# Patient Record
Sex: FEMALE | Employment: FULL TIME | URBAN - METROPOLITAN AREA
[De-identification: names, ages, dates, MRNs, and addresses within clinical notes are randomized per-mention and may not be internally consistent; named-entity substitution may affect disease eponyms.]

---

## 2012-08-10 LAB
EXTERNAL HIV SCREEN: NORMAL
HCV AB SER-ACNC: NEGATIVE

## 2022-03-14 ENCOUNTER — OFFICE VISIT (OUTPATIENT)
Dept: URGENT CARE | Facility: CLINIC | Age: 47
End: 2022-03-14
Payer: COMMERCIAL

## 2022-03-14 VITALS
HEIGHT: 63 IN | WEIGHT: 140 LBS | TEMPERATURE: 98.5 F | BODY MASS INDEX: 24.8 KG/M2 | HEART RATE: 77 BPM | OXYGEN SATURATION: 100 % | RESPIRATION RATE: 18 BRPM

## 2022-03-14 DIAGNOSIS — R68.89 FLU-LIKE SYMPTOMS: ICD-10-CM

## 2022-03-14 DIAGNOSIS — R05.1 ACUTE COUGH: Primary | ICD-10-CM

## 2022-03-14 PROCEDURE — 87636 SARSCOV2 & INF A&B AMP PRB: CPT | Performed by: EMERGENCY MEDICINE

## 2022-03-14 PROCEDURE — 99203 OFFICE O/P NEW LOW 30 MIN: CPT | Performed by: EMERGENCY MEDICINE

## 2022-03-14 RX ORDER — ALBUTEROL SULFATE 90 UG/1
2 AEROSOL, METERED RESPIRATORY (INHALATION) EVERY 6 HOURS PRN
Qty: 8.5 G | Refills: 0 | Status: SHIPPED | OUTPATIENT
Start: 2022-03-14

## 2022-03-14 RX ORDER — BUPROPION HYDROCHLORIDE 75 MG/1
75 TABLET ORAL DAILY
COMMUNITY
Start: 2022-01-06

## 2022-03-14 NOTE — LETTER
March 14, 2022     Patient: Shimon Ingram   YOB: 1975   Date of Visit: 3/14/2022       To Whom it May Concern:    Shimon Ingram was seen in my clinic on 3/14/2022  She should remain out of work/school for 5 days since symptom onset or 24 hours fever free without the use of fever reducing drugs, whichever is longer AND overall general improvement in symptoms and must adhere to strict masking guidelines for 5 more days         If you have any questions or concerns, please don't hesitate to call           Sincerely,          Nisha Brandon MD        CC: No Recipients

## 2022-03-14 NOTE — PROGRESS NOTES
[facility]        NAME: Julieta Mohr is a 48 y o  female  : 1975    MRN: [id number]  DATE: 2022  TIME: 8:24 AM    Assessment and Plan   Acute cough [R05 1]  1  Acute cough  Covid/Flu-Office Collect         Patient Instructions     Patient Instructions     You have been diagnosed with a flu-like illness, and your symptoms should resolve over the next 7 to 10 days with the treatments recommended today  If they do not, it is possible that you have developed a bacterial infection and you should return  If you were to take an antibiotic while you are still in the viral stage, you will not get better any faster, but could kill off good germs in your body as well as make germs resistant to the antibiotic  Take an expectorant - guaifenesin should be the only ingredient - during the day, and the cough suppressant (ex  Robitussin DM or Tessalon) if needed at night only  Take Zinc 50 mg every 12 hours for the next week  You should also take Quercetin 500 mg twice daily with it  You should also take vitamin D3 5000 i u s per day for the next 1 week, and vitamin-C 1 g daily  May take Flonase as discussed  You may also take a decongestant like Sudafed, unless you have hypertension or cardiac disease  You may take Imodium for diarrhea according to package instructions  Today you were tested for COVID-19 and influenza  Results will return approximately 5 days  The fastest way to receive results is to download the [facility] MyChart brissa on your phone or great account on a computer  If you view your results on [employer], we will not call you  If you do not see results on [employer], we will call you if your positive or negative  WE CANNOT PRINT YOUR TEST RESULTS FROM THE URGENT CARE  This is against a law called HIPAA  You may print results from your [facility] (IT help [phone] option 5) or call medical records at [phone]  Prophylactically self quarantine   Department  health's newest recommendations as of December 27,2021 state the following:     IF you TEST POSITIVE for COVID-19  -stay home for 5 days  If you have no symptoms or your symptoms are resolving after 5 days, you can leave your house  Continue to wear a mask around others for 5 additional days  If you have a fever, continue to stay home until you fever resolves  IF YOU WERE EXPOSED TO SOMEONE WITH COVID 19  -if you have been boosted OR completed the primary series of Pfizer or Moderna vaccine within the last 6 months OR completed the primary series of J&J vaccine within the last 2 months, wear a mask around others for 10 days  Get tested on day 5 if possible  If you develop symptoms, get a test and stay home      -if you completed the primary series of Pfizer or Moderna vaccine over 6 months ago and are NOT boosted OR completed the primary series of J&J over 2 months ago and are NOT boosted OR are unvaccinated, stay home for 5 days  After that continue to wear a mask around others for 5 additional days  If you can not quarantine you must wear a mask for 10 days  Test on day 5 if possible  If you develops symptoms get a test and stay home  Drink lots of fluids to maintain hydration  Do not touch your face, wash hands often, and practice social distancing  There is no treatment for simple outpatient COVID-19 patients however, CDC recommends 2000 units vitamin D3 to boost the immune system  Those with severe illness, older age, or multiple comorbidities may qualify for monoclonal antibody infusions as treatment  Please call your doctor to see if you qualify  Call your family doctor to have a follow-up appointment in next few days  Go to ER if he began experiencing chest pain, shortness of breath, fever that is not responding to antipyretics or other severe symptoms  Follow up with PCP in 3-5 days  Proceed to ER if symptoms worsen      Flu-like illness   AMBULATORY CARE:   Flu-like illness is an infection caused by a virus  The flu is easily spread when an infected person coughs, sneezes, or has close contact with others  You may be able to spread the flu to others for 1 week or longer after signs or symptoms appear  Common signs and symptoms include the following:   · Fever and chills    · Headaches, body aches, and muscle or joint pain    · Cough, runny nose, and sore throat    · Loss of appetite, nausea, vomiting, or diarrhea    · Tiredness    · Trouble breathing  Call 911 for any of the following:   · You have trouble breathing, and your lips look purple or blue  · You have a seizure  Seek care immediately if:   · You are dizzy, or you are urinating less or not at all  · You have a headache with a stiff neck, and you feel tired or confused  · You have new pain or pressure in your chest     · Your symptoms, such as shortness of breath, vomiting, or diarrhea, get worse  · Your symptoms, such as fever and coughing, seem to get better, but then get worse  Contact your healthcare provider if:   · You have new muscle pain or weakness  · You have questions or concerns about your condition or care  Treatment for influenza  may include any of the following:  · Acetaminophen decreases pain and fever  It is available without a doctor's order  Ask how much to take and how often to take it  Follow directions  Acetaminophen can cause liver damage if not taken correctly  · NSAIDs  such as ibuprofen, help decrease swelling, pain, and fever  This medicine is available with or without a doctor's order  NSAIDs can cause stomach bleeding or kidney problems in certain people  If you take blood thinner medicine, always ask your healthcare provider if NSAIDs are safe for you  Always read the medicine label and follow directions  · Antivirals  help fight a viral infection  Manage your symptoms:   · Rest  as much as you can to help you recover  · Drink liquids as directed  to help prevent dehydration   Ask how much liquid to drink each day and which liquids are best for you  Prevent the spread of the flu:   · Wash your hands often  Use soap and water  Wash your hands after you use the bathroom, change a child's diapers, or sneeze  Wash your hands before you prepare or eat food  Use gel hand cleanser when soap and water are not available  Do not touch your eyes, nose, or mouth unless you have washed your hands first        · Cover your mouth when you sneeze or cough  Cough into a tissue or the bend of your arm  · Clean shared items with a germ-killing   Clean table surfaces, doorknobs, and light switches  Do not share towels, silverware, and dishes with people who are sick  Wash bed sheets, towels, silverware, and dishes with soap and water  · Wear a mask  over your mouth and nose if you are sick or are near anyone who is sick  · Stay away from others  if you are sick  · Influenza vaccine  helps prevent influenza (flu)  Everyone older than 6 months should get a yearly influenza vaccine  Get the vaccine as soon as it is available, usually in September or October each year  Follow up with your healthcare provider as directed:  Write down your questions so you remember to ask them during your visits  © 2017 [employer] Information is for End User's use only and may not be sold, redistributed or otherwise used for commercial purposes  All illustrations and images included in CareNotes® are the copyrighted property of [employer]  or [employer]  The above information is an  only  It is not intended as medical advice for individual conditions or treatments  Talk to your doctor, nurse or pharmacist before following any medical regimen to see if it is safe and effective for you       COVID-19    [address]     Your healthcare provider and/or public health staff have evaluated you and have determined that you do not need to be hospitalized at this time  At this time you can be isolated at home where you will be monitored by staff from your local or state health department  You should carefully follow the prevention and isolation steps below until a healthcare provider or local or state health department says that you can return to your normal activities  Stay home except to get medical care     People who are mildly ill with COVID-19 are able to isolate at home during their illness  You should restrict activities outside your home, except for getting medical care  Do not go to work, school, or public areas  Avoid using public transportation, ride-sharing, or taxis  Separate yourself from other people and animals in your home     People: As much as possible, you should stay in a specific room and away from other people in your home  Also, you should use a separate bathroom, if available  Animals: You should restrict contact with pets and other animals while you are sick with COVID-19, just like you would around other people  Although there have not been reports of pets or other animals becoming sick with COVID-19, it is still recommended that people sick with COVID-19 limit contact with animals until more information is known about the virus  When possible, have another member of your household care for your animals while you are sick  If you are sick with COVID-19, avoid contact with your pet, including petting, snuggling, being kissed or licked, and sharing food  If you must care for your pet or be around animals while you are sick, wash your hands before and after you interact with pets and wear a facemask  See COVID-19 and Animals for more information  Call ahead before visiting your doctor     If you have a medical appointment, call the healthcare provider and tell them that you have or may have COVID-19   This will help the healthcare providers office take steps to keep other people from getting infected or exposed  Wear a facemask     You should wear a facemask when you are around other people (e g , sharing a room or vehicle) or pets and before you enter a healthcare providers office  If you are not able to wear a facemask (for example, because it causes trouble breathing), then people who live with you should not stay in the same room with you, or they should wear a facemask if they enter your room  Cover your coughs and sneezes     Cover your mouth and nose with a tissue when you cough or sneeze  Throw used tissues in a lined trash can  Immediately wash your hands with soap and water for at least 20 seconds or, if soap and water are not available, clean your hands with an alcohol-based hand  that contains at least 60% alcohol  Clean your hands often     Wash your hands often with soap and water for at least 20 seconds, especially after blowing your nose, coughing, or sneezing; going to the bathroom; and before eating or preparing food  If soap and water are not readily available, use an alcohol-based hand  with at least 60% alcohol, covering all surfaces of your hands and rubbing them together until they feel dry  Soap and water are the best option if hands are visibly dirty  Avoid touching your eyes, nose, and mouth with unwashed hands  Avoid sharing personal household items     You should not share dishes, drinking glasses, cups, eating utensils, towels, or bedding with other people or pets in your home  After using these items, they should be washed thoroughly with soap and water  Clean all high-touch surfaces everyday     High touch surfaces include counters, tabletops, doorknobs, bathroom fixtures, toilets, phones, keyboards, tablets, and bedside tables  Also, clean any surfaces that may have blood, stool, or body fluids on them  Use a household cleaning spray or wipe, according to the label instructions   Labels contain instructions for safe and effective use of the cleaning product including precautions you should take when applying the product, such as wearing gloves and making sure you have good ventilation during use of the product  Monitor your symptoms     Seek prompt medical attention if your illness is worsening (e g , difficulty breathing)  Before seeking care, call your healthcare provider and tell them that you have, or are being evaluated for, COVID-19  Put on a facemask before you enter the facility  These steps will help the healthcare providers office to keep other people in the office or waiting room from getting infected or exposed  Ask your healthcare provider to call the local or [facility] health department  Persons who are placed under active monitoring or facilitated self-monitoring should follow instructions provided by their local health department or occupational health professionals, as appropriate  If you have a medical emergency and need to call 911, notify the dispatch personnel that you have, or are being evaluated for COVID-19  If possible, put on a facemask before emergency medical services arrive  Discontinuing home isolation     Patients with confirmed COVID-19 should remain under home isolation precautions until the risk of secondary transmission to others is thought to be low  The decision to discontinue home isolation precautions should be made on a case-by-case basis, in consultation with healthcare providers and [facility] and [facility] health departments  Source: [employer]        Proceed to ER if symptoms worsen        Follow up with PCP in 3-5 days  Proceed to  ER if symptoms worsen      Chief Complaint     Chief Complaint   Patient presents with    COVID-19     c/o cough, fatigue,generalized aches, headache, fever, onset 4 days ago         History of Present Illness       Patient complains of cough, congestion, generalized aches, fatigue, fevers, headaches for the past 4 days       Review of Systems   Review of Systems   Constitutional: Positive for fever  Negative for appetite change, chills and fatigue  HENT: Positive for congestion, rhinorrhea, sinus pressure and sore throat  Negative for trouble swallowing and voice change  Respiratory: Positive for cough and chest tightness  Negative for shortness of breath and wheezing  Cardiovascular: Negative for chest pain  Gastrointestinal: Negative for nausea  Musculoskeletal: Positive for myalgias  Neurological: Positive for headaches  Current Medications       Current Outpatient Medications:     buPROPion (WELLBUTRIN) 75 mg tablet, Take 75 mg by mouth in the morning, Disp: , Rfl:     sertraline (ZOLOFT) 50 mg tablet, Take 50 mg by mouth in the morning, Disp: , Rfl:     Current Allergies     Allergies as of 03/14/2022    (No Known Allergies)            The following portions of the patient's history were reviewed and updated as appropriate: allergies, current medications, past family history, past medical history, past social history, past surgical history and problem list      Past Medical History:   Diagnosis Date    Anxiety        Past Surgical History:   Procedure Laterality Date    CHOLECYSTECTOMY      MOUTH SURGERY         Family History   Problem Relation Age of Onset    Heart disease Mother     Hypertension Mother     Heart disease Father     Hypertension Father          Medications have been verified  Objective   Ht 5' 3" (1 6 m)   Wt 63 5 kg (140 lb)   BMI 24 80 kg/m²        Physical Exam     Physical Exam  Vitals and nursing note reviewed  Constitutional:       General: She is not in acute distress  Appearance: She is well-developed  HENT:      Head: Normocephalic and atraumatic  Nose: Mucosal edema and congestion present  Mouth/Throat:      Pharynx: Posterior oropharyngeal erythema present  No oropharyngeal exudate  Tonsils: No tonsillar abscesses  Cardiovascular:      Rate and Rhythm: Normal rate and regular rhythm  Comments: Mild tachycardia  Pulmonary:      Effort: Pulmonary effort is normal  No respiratory distress  Breath sounds: No wheezing or rales  Abdominal:      General: Bowel sounds are normal       Palpations: Abdomen is soft  Musculoskeletal:      Cervical back: Neck supple  Skin:     General: Skin is warm and dry  Findings: No rash  Neurological:      Mental Status: She is alert and oriented to person, place, and time  Psychiatric:         Mood and Affect: Mood normal          Behavior: Behavior normal          Thought Content:  Thought content normal          Judgment: Judgment normal

## 2022-03-14 NOTE — PATIENT INSTRUCTIONS
You have been diagnosed with a flu-like illness, and your symptoms should resolve over the next 7 to 10 days with the treatments recommended today  If they do not, it is possible that you have developed a bacterial infection and you should return  If you were to take an antibiotic while you are still in the viral stage, you will not get better any faster, but could kill off good germs in your body as well as make germs resistant to the antibiotic  Take an expectorant - guaifenesin should be the only ingredient - during the day, and the cough suppressant (ex  Robitussin DM or Tessalon) if needed at night only  Take Zinc 50 mg every 12 hours for the next week  You should also take Quercetin 500 mg twice daily with it  You should also take vitamin D3 5000 i u s per day for the next 1 week, and vitamin-C 1 g daily  May take Flonase as discussed  You may also take a decongestant like Sudafed, unless you have hypertension or cardiac disease  You may take Imodium for diarrhea according to package instructions  Today you were tested for COVID-19 and influenza  Results will return approximately 5 days  The fastest way to receive results is to download the [facility] MyChart brissa on your phone or great account on a computer  If you view your results on [employer], we will not call you  If you do not see results on [employer], we will call you if your positive or negative  WE CANNOT PRINT YOUR TEST RESULTS FROM THE URGENT CARE  This is against a law called HIPAA  You may print results from your [employer] (IT help [phone] option 5) or call medical records at [phone]  Prophylactically self quarantine  Department of health's newest recommendations as of December 27,2021 state the following:     IF you TEST POSITIVE for COVID-19  -stay home for 5 days  If you have no symptoms or your symptoms are resolving after 5 days, you can leave your house   Continue to wear a mask around others for 5 additional days  If you have a fever, continue to stay home until you fever resolves  IF YOU WERE EXPOSED TO SOMEONE WITH COVID 19  -if you have been boosted OR completed the primary series of Pfizer or Moderna vaccine within the last 6 months OR completed the primary series of J&J vaccine within the last 2 months, wear a mask around others for 10 days  Get tested on day 5 if possible  If you develop symptoms, get a test and stay home      -if you completed the primary series of Pfizer or Moderna vaccine over 6 months ago and are NOT boosted OR completed the primary series of J&J over 2 months ago and are NOT boosted OR are unvaccinated, stay home for 5 days  After that continue to wear a mask around others for 5 additional days  If you can not quarantine you must wear a mask for 10 days  Test on day 5 if possible  If you develops symptoms get a test and stay home  Drink lots of fluids to maintain hydration  Do not touch your face, wash hands often, and practice social distancing  There is no treatment for simple outpatient COVID-19 patients however, CDC recommends 2000 units vitamin D3 to boost the immune system  Those with severe illness, older age, or multiple comorbidities may qualify for monoclonal antibody infusions as treatment  Please call your doctor to see if you qualify  Call your family doctor to have a follow-up appointment in next few days  Go to ER if he began experiencing chest pain, shortness of breath, fever that is not responding to antipyretics or other severe symptoms  Follow up with PCP in 3-5 days  Proceed to ER if symptoms worsen  Flu-like illness   AMBULATORY CARE:   Flu-like illness is an infection caused by a virus  The flu is easily spread when an infected person coughs, sneezes, or has close contact with others  You may be able to spread the flu to others for 1 week or longer after signs or symptoms appear     Common signs and symptoms include the following:   · Fever and chills    · Headaches, body aches, and muscle or joint pain    · Cough, runny nose, and sore throat    · Loss of appetite, nausea, vomiting, or diarrhea    · Tiredness    · Trouble breathing  Call 911 for any of the following:   · You have trouble breathing, and your lips look purple or blue  · You have a seizure  Seek care immediately if:   · You are dizzy, or you are urinating less or not at all  · You have a headache with a stiff neck, and you feel tired or confused  · You have new pain or pressure in your chest     · Your symptoms, such as shortness of breath, vomiting, or diarrhea, get worse  · Your symptoms, such as fever and coughing, seem to get better, but then get worse  Contact your healthcare provider if:   · You have new muscle pain or weakness  · You have questions or concerns about your condition or care  Treatment for influenza  may include any of the following:  · Acetaminophen decreases pain and fever  It is available without a doctor's order  Ask how much to take and how often to take it  Follow directions  Acetaminophen can cause liver damage if not taken correctly  · NSAIDs  such as ibuprofen, help decrease swelling, pain, and fever  This medicine is available with or without a doctor's order  NSAIDs can cause stomach bleeding or kidney problems in certain people  If you take blood thinner medicine, always ask your healthcare provider if NSAIDs are safe for you  Always read the medicine label and follow directions  · Antivirals  help fight a viral infection  Manage your symptoms:   · Rest  as much as you can to help you recover  · Drink liquids as directed  to help prevent dehydration  Ask how much liquid to drink each day and which liquids are best for you  Prevent the spread of the flu:   · Wash your hands often  Use soap and water  Wash your hands after you use the bathroom, change a child's diapers, or sneeze  Wash your hands before you prepare or eat food  Use gel hand cleanser when soap and water are not available  Do not touch your eyes, nose, or mouth unless you have washed your hands first        · Cover your mouth when you sneeze or cough  Cough into a tissue or the bend of your arm  · Clean shared items with a germ-killing   Clean table surfaces, doorknobs, and light switches  Do not share towels, silverware, and dishes with people who are sick  Wash bed sheets, towels, silverware, and dishes with soap and water  · Wear a mask  over your mouth and nose if you are sick or are near anyone who is sick  · Stay away from others  if you are sick  · Influenza vaccine  helps prevent influenza (flu)  Everyone older than 6 months should get a yearly influenza vaccine  Get the vaccine as soon as it is available, usually in September or October each year  Follow up with your healthcare provider as directed:  Write down your questions so you remember to ask them during your visits  © 2017 [address]  Information is for End User's use only and may not be sold, redistributed or otherwise used for commercial purposes  All illustrations and images included in CareNotes® are the copyrighted property of A D A M , Inc  or [employer]  The above information is an  only  It is not intended as medical advice for individual conditions or treatments  Talk to your doctor, nurse or pharmacist before following any medical regimen to see if it is safe and effective for you  [address]     Your healthcare provider and/or public health staff have evaluated you and have determined that you do not need to be hospitalized at this time  At this time you can be isolated at home where you will be monitored by staff from your local or state health department   You should carefully follow the prevention and isolation steps below until a healthcare provider or local or state health department says that you can return to your normal activities  Stay home except to get medical care     People who are mildly ill with COVID-19 are able to isolate at home during their illness  You should restrict activities outside your home, except for getting medical care  Do not go to work, school, or public areas  Avoid using public transportation, ride-sharing, or taxis  Separate yourself from other people and animals in your home     People: As much as possible, you should stay in a specific room and away from other people in your home  Also, you should use a separate bathroom, if available  Animals: You should restrict contact with pets and other animals while you are sick with COVID-19, just like you would around other people  Although there have not been reports of pets or other animals becoming sick with COVID-19, it is still recommended that people sick with COVID-19 limit contact with animals until more information is known about the virus  When possible, have another member of your household care for your animals while you are sick  If you are sick with COVID-19, avoid contact with your pet, including petting, snuggling, being kissed or licked, and sharing food  If you must care for your pet or be around animals while you are sick, wash your hands before and after you interact with pets and wear a facemask  See COVID-19 and Animals for more information  Call ahead before visiting your doctor     If you have a medical appointment, call the healthcare provider and tell them that you have or may have COVID-19  This will help the healthcare providers office take steps to keep other people from getting infected or exposed  Wear a facemask     You should wear a facemask when you are around other people (e g , sharing a room or vehicle) or pets and before you enter a healthcare providers office   If you are not able to wear a facemask (for example, because it causes trouble breathing), then people who live with you should not stay in the same room with you, or they should wear a facemask if they enter your room  Cover your coughs and sneezes     Cover your mouth and nose with a tissue when you cough or sneeze  Throw used tissues in a lined trash can  Immediately wash your hands with soap and water for at least 20 seconds or, if soap and water are not available, clean your hands with an alcohol-based hand  that contains at least 60% alcohol  Clean your hands often     Wash your hands often with soap and water for at least 20 seconds, especially after blowing your nose, coughing, or sneezing; going to the bathroom; and before eating or preparing food  If soap and water are not readily available, use an alcohol-based hand  with at least 60% alcohol, covering all surfaces of your hands and rubbing them together until they feel dry  Soap and water are the best option if hands are visibly dirty  Avoid touching your eyes, nose, and mouth with unwashed hands  Avoid sharing personal household items     You should not share dishes, drinking glasses, cups, eating utensils, towels, or bedding with other people or pets in your home  After using these items, they should be washed thoroughly with soap and water  Clean all high-touch surfaces everyday     High touch surfaces include counters, tabletops, doorknobs, bathroom fixtures, toilets, phones, keyboards, tablets, and bedside tables  Also, clean any surfaces that may have blood, stool, or body fluids on them  Use a household cleaning spray or wipe, according to the label instructions  Labels contain instructions for safe and effective use of the cleaning product including precautions you should take when applying the product, such as wearing gloves and making sure you have good ventilation during use of the product  Monitor your symptoms     Seek prompt medical attention if your illness is worsening (e g , difficulty breathing)  Before seeking care, call your healthcare provider and tell them that you have, or are being evaluated for, COVID-19  Put on a facemask before you enter the facility  These steps will help the healthcare providers office to keep other people in the office or waiting room from getting infected or exposed  Ask your healthcare provider to call the local or state health department  Persons who are placed under active monitoring or facilitated self-monitoring should follow instructions provided by their local health department or occupational health professionals, as appropriate  If you have a medical emergency and need to call 911, notify the dispatch personnel that you have, or are being evaluated for COVID-19  If possible, put on a facemask before emergency medical services arrive  Discontinuing home isolation     Patients with confirmed COVID-19 should remain under home isolation precautions until the risk of secondary transmission to others is thought to be low  The decision to discontinue home isolation precautions should be made on a case-by-case basis, in consultation with healthcare providers and state and local health departments       Source: [employer]        Proceed to ER if symptoms worsen

## 2022-03-15 LAB
FLUAV RNA RESP QL NAA+PROBE: POSITIVE
FLUBV RNA RESP QL NAA+PROBE: NEGATIVE
SARS-COV-2 RNA RESP QL NAA+PROBE: NEGATIVE

## 2022-10-03 NOTE — PROGRESS NOTES
Assessment/Plan:    No problem-specific Assessment & Plan notes found for this encounter  Diagnoses and all orders for this visit:    Anxiety  -     hydrOXYzine HCL (ATARAX) 25 mg tablet; Take 1 tablet (25 mg total) by mouth every 6 (six) hours as needed for itching  -     buPROPion (Wellbutrin XL) 150 mg 24 hr tablet; Take 1 tablet (150 mg total) by mouth every morning  -     Ambulatory referral to [facility]; Future    Overweight (BMI 25 0-29 9)  -     CBC and differential; Future  -     Comprehensive metabolic panel; Future  -     Lipid panel; Future  -     TSH, 3rd generation with Free T4 reflex; Future    Low vitamin D level  -     Vitamin D 25 hydroxy; Future    Pituitary adenoma (HCC)  -     Prolactin; Future    Other orders  -     Cholecalciferol 50 MCG (2000 UT) CAPS; Take 2,000 Units by mouth in the morning  -     Sodium Fluoride 5000 Plus 1 1 % CREA        Discussed anxiety symptoms with patient today  Will refer to behavioral health for therapy  I discussed with patient and she does not want to start any medications that will cause weight gain  At this time I will start her on hydroxyzine p r n  25 mg  We discussed and will continue Wellbutrin I will start her on 150 mg of XL  Patient will establish care at our office at next follow-up visit  Baseline labs were ordered and included vitamin-D  She reports a history of prolactinoma  I will start by checking a prolactin level  Chart will need to be reviewed for her history and see if she has had any imaging done relatively recently  Patient denies any symptoms today  Labs sheet was given to be done at [facility] fasting  Follow-up in 1 month  Subjective:      Patient ID: Tigist Chavira is a 50 y o  female      HPI    The following portions of the patient's history were reviewed and updated as appropriate: allergies, current medications, past family history, past medical history, past social history, past surgical history and problem list     Anxiety  Patient is here for evaluation of anxiety  She has the following anxiety symptoms: difficulty concentrating, fatigue, insomnia, palpitations, racing thoughts, shortness of breath, sweating  Onset of symptoms was approximately several years ago  Symptoms have been gradually worsening since that time  She denies current suicidal and homicidal ideation  Family history significant for anxiety and depression  Possible organic causes contributing are: none  Risk factors: positive family history in  brother(s) Previous treatment includes individual therapy and medication Wellbutrin and Zoloft  She complains of the following medication side effects: weight gain  She states that she stopped both of her medications due to weight gain  She states that she has gone from 105 lb to 150  She does not want to be on medication that will cause her to gain weight  Review of Systems   Constitutional: Positive for diaphoresis  Weight gain   Respiratory: Negative for shortness of breath  Cardiovascular: Positive for palpitations  Negative for chest pain  Gastrointestinal: Negative for abdominal pain, constipation, diarrhea, nausea and vomiting  Psychiatric/Behavioral: Positive for decreased concentration, dysphoric mood and sleep disturbance  Negative for self-injury and suicidal ideas  The patient is nervous/anxious  Some possible OCD symptoms         Objective:      /60 (BP Location: Right arm, Patient Position: Sitting, Cuff Size: Large)   Pulse 77   Ht 5' 3" (1 6 m)   Wt 68 5 kg (151 lb)   SpO2 98%   BMI 26 75 kg/m²          Physical Exam  Vitals and nursing note reviewed  Constitutional:       General: She is not in acute distress  Appearance: Normal appearance  HENT:      Head: Normocephalic and atraumatic        Right Ear: External ear normal       Left Ear: External ear normal       Nose: Nose normal       Mouth/Throat:      Mouth: Mucous membranes are moist       Pharynx: Oropharynx is clear  Eyes:      Extraocular Movements: Extraocular movements intact  Conjunctiva/sclera: Conjunctivae normal       Pupils: Pupils are equal, round, and reactive to light  Cardiovascular:      Rate and Rhythm: Normal rate and regular rhythm  Heart sounds: Normal heart sounds  No murmur heard  No friction rub  No gallop  Pulmonary:      Effort: Pulmonary effort is normal  No respiratory distress  Breath sounds: Normal breath sounds  No wheezing  Musculoskeletal:         General: No tenderness  Normal range of motion  Cervical back: Normal range of motion and neck supple  Lymphadenopathy:      Cervical: No cervical adenopathy  Skin:     General: Skin is warm and dry  Findings: No erythema or rash  Neurological:      General: No focal deficit present  Mental Status: She is alert and oriented to person, place, and time  Mental status is at baseline  Psychiatric:         Behavior: Behavior normal          Thought Content:  Thought content normal          Judgment: Judgment normal       Comments: Anxious

## 2022-10-04 PROBLEM — R79.89 LOW VITAMIN D LEVEL: Status: ACTIVE | Noted: 2021-04-20

## 2022-10-04 PROBLEM — F41.1 GENERALIZED ANXIETY DISORDER: Status: ACTIVE | Noted: 2021-04-20

## 2022-10-04 PROBLEM — R92.8 ABNORMAL MAMMOGRAM OF LEFT BREAST: Status: ACTIVE | Noted: 2019-10-27

## 2022-10-06 ENCOUNTER — TELEPHONE (OUTPATIENT)
Dept: ADMINISTRATIVE | Facility: OTHER | Age: 47
End: 2022-10-06

## 2022-10-06 ENCOUNTER — RA CDI HCC (OUTPATIENT)
Dept: OTHER | Facility: HOSPITAL | Age: 47
End: 2022-10-06

## 2022-10-06 NOTE — TELEPHONE ENCOUNTER
----- Message from Taryn Smart sent at 10/6/2022  9:41 AM EDT -----  Regarding: Care Gap request  10/06/22 9:41 AM    Hello, our patient attached above has had HIV completed/performed  Please assist in updating the patient chart by pulling the Care Everywhere (CE) document  The date of service is 2012    Hep c 2012       Thank you,  Samuel Prado  [address]

## 2022-10-06 NOTE — PROGRESS NOTES
[facility]  coding opportunities       Chart reviewed, no opportunity found: CHART REVIEWED, NO OPPORTUNITY FOUND        Patients Insurance        Commercial Insurance: [address] [place] [address]

## 2022-10-07 NOTE — TELEPHONE ENCOUNTER
Upon review of the In Basket request we were able to locate, review, and update the patient chart as requested for Hepatitis C  and HIV  Any additional questions or concerns should be emailed to the Practice Liaisons via the appropriate education email address, please do not reply via In Basket      Thank you  Dolores Butler

## 2022-10-10 ENCOUNTER — TELEPHONE (OUTPATIENT)
Dept: ADMINISTRATIVE | Facility: OTHER | Age: 47
End: 2022-10-10

## 2022-10-10 ENCOUNTER — OFFICE VISIT (OUTPATIENT)
Dept: FAMILY MEDICINE CLINIC | Facility: CLINIC | Age: 47
End: 2022-10-10

## 2022-10-10 VITALS
HEIGHT: 63 IN | DIASTOLIC BLOOD PRESSURE: 60 MMHG | SYSTOLIC BLOOD PRESSURE: 110 MMHG | OXYGEN SATURATION: 98 % | HEART RATE: 77 BPM | BODY MASS INDEX: 26.75 KG/M2 | WEIGHT: 151 LBS

## 2022-10-10 DIAGNOSIS — D35.2 PITUITARY ADENOMA (HCC): ICD-10-CM

## 2022-10-10 DIAGNOSIS — F41.9 ANXIETY: Primary | ICD-10-CM

## 2022-10-10 DIAGNOSIS — E66.3 OVERWEIGHT (BMI 25.0-29.9): ICD-10-CM

## 2022-10-10 DIAGNOSIS — R79.89 LOW VITAMIN D LEVEL: ICD-10-CM

## 2022-10-10 PROCEDURE — 99203 OFFICE O/P NEW LOW 30 MIN: CPT | Performed by: STUDENT IN AN ORGANIZED HEALTH CARE EDUCATION/TRAINING PROGRAM

## 2022-10-10 RX ORDER — BUPROPION HYDROCHLORIDE 150 MG/1
150 TABLET ORAL EVERY MORNING
Qty: 90 TABLET | Refills: 3 | Status: SHIPPED | OUTPATIENT
Start: 2022-10-10

## 2022-10-10 RX ORDER — SODIUM FLUORIDE 5 MG/G
CREAM DENTAL
COMMUNITY
Start: 2022-10-08

## 2022-10-10 RX ORDER — HYDROXYZINE HYDROCHLORIDE 25 MG/1
25 TABLET, FILM COATED ORAL EVERY 6 HOURS PRN
Qty: 90 TABLET | Refills: 3 | Status: SHIPPED | OUTPATIENT
Start: 2022-10-10

## 2022-10-10 NOTE — LETTER
Please send scanned lab     Procedure Request Form: Cervical Cancer Screening      Date Requested: 10/11/22  Patient: Justine Merlos  Patient : 1975   Referring Provider: No primary care provider on file  Date of Procedure ______________________________       The above patient has informed us that they have completed their   most recent Cervical Cancer Screening at your facility  Please complete   this form and attach all corresponding procedure reports/results  Comments __________________________________________________________  ____________________________________________________________________  ____________________________________________________________________  ____________________________________________________________________    Facility Completing Procedure _________________________________________    Form Completed By (print name) _______________________________________      Signature __________________________________________________________      These reports are needed for  compliance  Please fax this completed form and a copy of the procedure report to our office located at [place] [address] as soon as possible to [phone] ann Gilbert: Phone [phone]    We thank you for your assistance in treating our mutual patient

## 2022-10-10 NOTE — TELEPHONE ENCOUNTER
----- Message from Kimberli Torre, [facility] [place] sent at 10/10/2022 11:28 AM EDT -----  Regarding: Care Gap request  10/10/22 11:28 AM    Hello, our patient attached above has had Pap Smear (HPV) aka Cervical Cancer Screening completed/performed  Please assist in updating the patient chart by pulling the Care Everywhere (CE) document  The date of service is 2020       Thank you,  Kimberli Torre  [address]

## 2022-10-10 NOTE — LETTER
Procedure Request Form: Cervical Cancer Screening      Date Requested: 10/14/22  Patient: Brett Gross  Patient : 1975   Referring Provider: No primary care provider on file  Date of Procedure ______________________________       The above patient has informed us that they have completed their   most recent Cervical Cancer Screening at your facility  Please complete   this form and attach all corresponding procedure reports/results  Comments __________________________________________________________  ____________________________________________________________________  ____________________________________________________________________  ____________________________________________________________________    Facility Completing Procedure _________________________________________    Form Completed By (print name) _______________________________________      Signature __________________________________________________________      These reports are needed for  compliance  Please fax this completed form and a copy of the procedure report to our office located at [place] [address] as soon as possible to [phone] ann Love: Phone [phone]    We thank you for your assistance in treating our mutual patient

## 2022-10-11 NOTE — TELEPHONE ENCOUNTER
Upon review of the In Basket request and the patient's chart, initial outreach has been made via fax, please see Contacts section for details       Thank you  Ashlee Bass

## 2022-10-14 NOTE — TELEPHONE ENCOUNTER
As a follow-up, a second attempt has been made for outreach via fax, please see Contacts section for details       [address] [facility]  [place]   [phone]    Thank you  Russ Mccarty

## 2022-10-17 NOTE — TELEPHONE ENCOUNTER
As a final attempt, a third outreach has been made via telephone call  Please see Contacts section for details  This encounter will be closed and completed by end of day  Should we receive the requested information because of previous outreach attempts, the requested patient's chart will be updated appropriately       Thank you  Carl Lemon

## 2022-10-17 NOTE — TELEPHONE ENCOUNTER
Upon review of the In Basket request we were able to locate, review, and update the patient chart as requested for Pap Smear (HPV) aka Cervical Cancer Screening  Any additional questions or concerns should be emailed to the Practice Liaisons via the appropriate education email address, please do not reply via In Basket      Thank you  Samantha Sanderson

## 2023-02-15 ENCOUNTER — TELEPHONE (OUTPATIENT)
Dept: PSYCHIATRY | Facility: CLINIC | Age: 48
End: 2023-02-15

## 2023-02-15 NOTE — TELEPHONE ENCOUNTER
Dineshr contacted pt in regards to a vm we received and explained to her that we received a routine referral for [facility]  Writer also explained to her that there are no openings available at this time and offered her to place her on the wait list  She refused

## 2023-02-15 NOTE — TELEPHONE ENCOUNTER
Called patient regarding referral to be placed on proper wait list  LVM for patient to call intake dept ([phone]) back

## 2023-05-23 DIAGNOSIS — R45.89 DEPRESSED MOOD: ICD-10-CM

## 2023-05-23 DIAGNOSIS — F41.1 GENERALIZED ANXIETY DISORDER: Primary | ICD-10-CM

## 2023-05-23 RX ORDER — BUPROPION HYDROCHLORIDE 300 MG/1
300 TABLET ORAL EVERY MORNING
Qty: 90 TABLET | Refills: 3 | Status: SHIPPED | OUTPATIENT
Start: 2023-05-23 | End: 2024-05-17

## 2023-08-04 ENCOUNTER — VBI (OUTPATIENT)
Dept: ADMINISTRATIVE | Facility: OTHER | Age: 48
End: 2023-08-04

## 2023-08-04 NOTE — LETTER
Procedure Request Form: Colonoscopy      Date Requested: 23  Patient: Noam Aguiar  Patient : 1975   Referring Provider: Louise Rojas, DO        Date of Procedure ______________________________       The above patient has informed us that they have completed their   most recent Colonoscopy at your facility. Please complete   this form and attach all corresponding procedure reports/results. Comments __ Consult letter received for patients 2023 Colonoscopy. Would you release the OP Report to us?____________________________________________  ____________________________________________________________________  ____________________________________________________________________  ____________________________________________________________________    Facility Completing Procedure _________________________________________    Form Completed By (print name) _______________________________________      Signature __________________________________________________________      These reports are needed for  compliance. Please fax this completed form and a copy of the procedure report to our office located at [address] [place] [address] as soon as possible to Fax [phone] attention Deisi Monte: Phone [phone]    We thank you for your assistance in treating our mutual patient.

## 2023-08-07 NOTE — PROGRESS NOTES
Assessment/Plan:    No problem-specific Assessment & Plan notes found for this encounter. Diagnoses and all orders for this visit:    Eczema, unspecified type  -     Discontinue: triamcinolone (KENALOG) 0.5 % cream; Apply topically 3 (three) times a day  -     triamcinolone (KENALOG) 0.5 % cream; Apply topically 3 (three) times a day    Rash of face  -     Discontinue: desonide (DESOWEN) 0.05 % cream; Apply topically 2 (two) times a day  -     desonide (DESOWEN) 0.05 % cream; Apply topically 2 (two) times a day          Patient is a 42-year-old female presenting for follow-up visit today to discuss eczema. She reports a history of using steroid cream to resolve her symptoms. She would like a refill on triamcinolone cream at this time. I will send in triamcinolone to her pharmacy to be used on areas below the neck. I will also send her in desonide cream that she can use on the face or on the scalp. Follow-up in our office if symptoms or not improving after treatment. 20 minutes spent on physical exam and plan of care. This note was dictated using software. Subjective:      Patient ID: Figueroa Khan is a 50 y.o. female. HPI    The following portions of the patient's history were reviewed and updated as appropriate: allergies, current medications, past family history, past medical history, past social history, past surgical history and problem list.    Patient complains of a rash. Onset of symptoms was several days ago, and have been gradually worsening since that time. Treatment modalities that have been used in the past include: triamcinolone. She states that her rash typically starts on her hands. Sometimes spreads to her elbows or neck/face. It sometimes also goes to her scalp. She needs a refill on triamcinolone cream today. Review of Systems   Respiratory: Negative for shortness of breath. Cardiovascular: Negative for chest pain.    Gastrointestinal: Negative for abdominal pain, constipation and diarrhea. Skin: Positive for rash. Objective:      /80 (BP Location: Left arm, Patient Position: Sitting, Cuff Size: Standard)   Pulse 74   Ht 5' 3" (1.6 m)   Wt 62.7 kg (138 lb 3.2 oz)   SpO2 100%   BMI 24.48 kg/m²          Physical Exam  Vitals and nursing note reviewed. Constitutional:       General: She is not in acute distress. Appearance: Normal appearance. HENT:      Head: Normocephalic and atraumatic. Right Ear: External ear normal.      Left Ear: External ear normal.      Nose: Nose normal. No congestion. Mouth/Throat:      Mouth: Mucous membranes are moist.      Pharynx: Oropharynx is clear. Eyes:      Extraocular Movements: Extraocular movements intact. Conjunctiva/sclera: Conjunctivae normal.      Pupils: Pupils are equal, round, and reactive to light. Cardiovascular:      Rate and Rhythm: Normal rate. Pulmonary:      Effort: Pulmonary effort is normal. No respiratory distress. Musculoskeletal:         General: Normal range of motion. Cervical back: Normal range of motion. Skin:     General: Skin is warm and dry. Findings: Rash (eczematous rash noted to hands bilaterally) present. No erythema. Neurological:      General: No focal deficit present. Mental Status: She is alert and oriented to person, place, and time. Mental status is at baseline. Psychiatric:         Mood and Affect: Mood normal.         Behavior: Behavior normal.         Thought Content:  Thought content normal.         Judgment: Judgment normal.

## 2023-08-08 ENCOUNTER — OFFICE VISIT (OUTPATIENT)
Dept: FAMILY MEDICINE CLINIC | Facility: CLINIC | Age: 48
End: 2023-08-08

## 2023-08-08 VITALS
SYSTOLIC BLOOD PRESSURE: 123 MMHG | HEART RATE: 74 BPM | OXYGEN SATURATION: 100 % | DIASTOLIC BLOOD PRESSURE: 80 MMHG | HEIGHT: 63 IN | WEIGHT: 138.2 LBS | BODY MASS INDEX: 24.49 KG/M2

## 2023-08-08 DIAGNOSIS — L30.9 ECZEMA, UNSPECIFIED TYPE: Primary | ICD-10-CM

## 2023-08-08 DIAGNOSIS — R21 RASH OF FACE: ICD-10-CM

## 2023-08-08 PROCEDURE — 99213 OFFICE O/P EST LOW 20 MIN: CPT | Performed by: STUDENT IN AN ORGANIZED HEALTH CARE EDUCATION/TRAINING PROGRAM

## 2023-08-08 RX ORDER — DESONIDE 0.5 MG/G
CREAM TOPICAL 2 TIMES DAILY
Qty: 15 G | Refills: 0 | Status: SHIPPED | OUTPATIENT
Start: 2023-08-08 | End: 2023-08-08 | Stop reason: CLARIF

## 2023-08-08 RX ORDER — TRIAMCINOLONE ACETONIDE 5 MG/G
CREAM TOPICAL 3 TIMES DAILY
Qty: 15 G | Refills: 2 | Status: SHIPPED | OUTPATIENT
Start: 2023-08-08

## 2023-08-08 RX ORDER — TRIAMCINOLONE ACETONIDE 5 MG/G
CREAM TOPICAL 3 TIMES DAILY
Qty: 15 G | Refills: 2 | Status: SHIPPED | OUTPATIENT
Start: 2023-08-08 | End: 2023-08-08 | Stop reason: CLARIF

## 2023-08-08 RX ORDER — DESONIDE 0.5 MG/G
CREAM TOPICAL 2 TIMES DAILY
Qty: 15 G | Refills: 0 | Status: SHIPPED | OUTPATIENT
Start: 2023-08-08

## 2023-08-09 ENCOUNTER — TELEPHONE (OUTPATIENT)
Dept: ADMINISTRATIVE | Facility: OTHER | Age: 48
End: 2023-08-09

## 2023-08-09 NOTE — TELEPHONE ENCOUNTER
Upon review of the In Basket request we have found/obtained the documentation. After careful review of the document we are unable to complete this request for CRC: Colonoscopy because the documentation does not have the proper verbiage (wording) needed to close the requested care gap(s). Any additional questions or concerns should be emailed to the Practice Liaisons via the appropriate education email address, please do not reply via In Basket.     Thank you  Julia Staffrod MA

## 2023-08-09 NOTE — TELEPHONE ENCOUNTER
----- Message from Hardeep Guevara sent at 8/9/2023  9:07 AM EDT -----  Regarding: care gap request  01/04/23 1:48 PM    Hello, our patient attached above CRC: Colonoscopy completed/performed. Please assist in updating the patient chart by pulling the document from the Media Tab. The date of service is 8/2/23.      Thank you,  Hardeep Guevara  [address] [facility]

## 2023-08-09 NOTE — TELEPHONE ENCOUNTER
Upon review of the In Basket request we were able to locate, review, and update the patient chart as requested for CRC: Colonoscopy. Any additional questions or concerns should be emailed to the Practice Liaisons via the appropriate education email address, please do not reply via In Basket. Thank you  Omar Andrade         08/09/23 12:06 PM     VB CareGap SmartForm used to document caregap status.     Omar Andrade

## 2023-11-14 DIAGNOSIS — F41.9 ANXIETY: ICD-10-CM

## 2023-11-14 RX ORDER — HYDROXYZINE HYDROCHLORIDE 25 MG/1
TABLET, FILM COATED ORAL
Qty: 90 TABLET | Refills: 14 | Status: SHIPPED | OUTPATIENT
Start: 2023-11-14

## 2024-01-16 ENCOUNTER — TELEMEDICINE (OUTPATIENT)
Dept: FAMILY MEDICINE CLINIC | Facility: CLINIC | Age: 49
End: 2024-01-16

## 2024-01-16 DIAGNOSIS — R50.9 FEVER, UNSPECIFIED FEVER CAUSE: ICD-10-CM

## 2024-01-16 DIAGNOSIS — R53.83 FATIGUE, UNSPECIFIED TYPE: ICD-10-CM

## 2024-01-16 DIAGNOSIS — M79.10 MYALGIA: ICD-10-CM

## 2024-01-16 DIAGNOSIS — R11.2 NAUSEA AND VOMITING, UNSPECIFIED VOMITING TYPE: ICD-10-CM

## 2024-01-16 DIAGNOSIS — R05.1 ACUTE COUGH: ICD-10-CM

## 2024-01-16 DIAGNOSIS — R09.81 CONGESTION OF NASAL SINUS: ICD-10-CM

## 2024-01-16 DIAGNOSIS — J11.1 INFLUENZA: Primary | ICD-10-CM

## 2024-01-16 PROCEDURE — 99213 OFFICE O/P EST LOW 20 MIN: CPT | Performed by: STUDENT IN AN ORGANIZED HEALTH CARE EDUCATION/TRAINING PROGRAM

## 2024-01-16 RX ORDER — OSELTAMIVIR PHOSPHATE 75 MG/1
75 CAPSULE ORAL 2 TIMES DAILY
Qty: 10 CAPSULE | Refills: 0 | Status: SHIPPED | OUTPATIENT
Start: 2024-01-16 | End: 2024-01-21

## 2024-01-16 RX ORDER — ONDANSETRON 4 MG/1
4 TABLET, FILM COATED ORAL EVERY 8 HOURS PRN
Qty: 20 TABLET | Refills: 0 | Status: SHIPPED | OUTPATIENT
Start: 2024-01-16

## 2024-01-16 NOTE — PROGRESS NOTES
Virtual Regular Visit    Verification of patient location:    Patient is located at Other in the following state in which I hold an active license [place]      Assessment/Plan:    Problem List Items Addressed This Visit    None  Visit Diagnoses       Influenza    -  Primary    Relevant Medications    oseltamivir (TAMIFLU) 75 mg capsule    ondansetron (ZOFRAN) 4 mg tablet    Congestion of nasal sinus        Relevant Medications    oseltamivir (TAMIFLU) 75 mg capsule    Fever, unspecified fever cause        Relevant Medications    oseltamivir (TAMIFLU) 75 mg capsule    Myalgia        Relevant Medications    oseltamivir (TAMIFLU) 75 mg capsule    Nausea and vomiting, unspecified vomiting type        Relevant Medications    oseltamivir (TAMIFLU) 75 mg capsule    ondansetron (ZOFRAN) 4 mg tablet    Fatigue, unspecified type        Relevant Medications    oseltamivir (TAMIFLU) 75 mg capsule    Acute cough                     Patient is a 48-year-old female who is presenting today with cough congestion fever, also aches, nausea and vomiting, and fatigue.  Her presentation is highly suspicious for influenza.  I cannot definitively say she has this but based on how her symptoms rapidly progressed and COVID testing was negative I will treat her for suspected flu.  I will start her on Tamiflu 75 mg twice daily for 5 days.  I will also call her in Zofran for her nausea.  I recommended a brat diet and to start advancing diet as tolerated.  Make sure to drink plenty of fluids and replace electrolytes.  I will give her a work note so she can go back to work starting on Thursday.  Patient is in agreement with the plan    Recommend supportive care with fluids, rest, flonase 1-2 sprays each nostril, otc claritin or zyrtec daily and mucinex as directed. Tylenol recommended prn for pain or fever.  Instructed pt RTO if symptoms persist or worsen over the course of the next week     20 minutes spent on physical exam and plan of care.  This  note was dictated using software.    Reason for visit is No chief complaint on file.       Encounter provider Rambo Sanz DO    Provider located at [facility] IN PARTNERSHIP WITH [facility]  [address] [facility] [place] [address]      Recent Visits  No visits were found meeting these conditions.  Showing recent visits within past 7 days and meeting all other requirements  Today's Visits  Date Type Provider Dept   01/16/24 Telemedicine Rambo Sanz DO [facility]   Showing today's visits and meeting all other requirements  Future Appointments  No visits were found meeting these conditions.  Showing future appointments within next 150 days and meeting all other requirements       The patient was identified by name and date of birth. Emily De La Vega was informed that this is a telemedicine visit and that the visit is being conducted through the Epic Embedded platform. She agrees to proceed..  My office door was closed. No one else was in the room.  She acknowledged consent and understanding of privacy and security of the video platform. The patient has agreed to participate and understands they can discontinue the visit at any time.    Patient is aware this is a billable service.     Subjective  Emily De La Vega is a 48 y.o. female presenting for an acute visit.    Patient complains of congestion, fever, body aches, fatigue, nausea and vomiting.  Onset of symptoms was 1 days ago, and has been rapidly worsening since that time. Treatment to date: none.  Home COVID test was negative.        HPI     Past Medical History:   Diagnosis Date    Anxiety        Past Surgical History:   Procedure Laterality Date    CHOLECYSTECTOMY      MOUTH SURGERY         Current Outpatient Medications   Medication Sig Dispense Refill    ondansetron (ZOFRAN) 4 mg tablet Take 1 tablet (4 mg total) by mouth every 8 (eight) hours as needed for  nausea or vomiting 20 tablet 0    oseltamivir (TAMIFLU) 75 mg capsule Take 1 capsule (75 mg total) by mouth 2 (two) times a day for 5 days 10 capsule 0    buPROPion (WELLBUTRIN XL) 300 mg 24 hr tablet Take 1 tablet (300 mg total) by mouth every morning 90 tablet 3    Cholecalciferol 50 MCG (2000 UT) CAPS Take 2,000 Units by mouth in the morning      desonide (DESOWEN) 0.05 % cream Apply topically 2 (two) times a day 15 g 0    hydrOXYzine HCL (ATARAX) 25 mg tablet TAKE 1 TABLET EVERY 6 HOURS AS NEEDED FOR ITCHING 90 tablet 14    Sodium Fluoride 5000 Plus 1.1 % CREA       triamcinolone (KENALOG) 0.5 % cream Apply topically 3 (three) times a day 15 g 2     No current facility-administered medications for this visit.        No Known Allergies    Review of Systems   Constitutional:  Positive for fever.   HENT:  Positive for congestion.    Respiratory:  Negative for shortness of breath.    Cardiovascular:  Negative for chest pain.   Gastrointestinal:  Negative for abdominal pain, constipation and diarrhea.   Genitourinary:  Negative for difficulty urinating.   Musculoskeletal:  Positive for myalgias.   Skin:  Negative for rash.       Video Exam    There were no vitals filed for this visit.    Physical Exam  Constitutional:       General: She is not in acute distress.     Appearance: Normal appearance. She is ill-appearing. She is not toxic-appearing.   HENT:      Head: Normocephalic and atraumatic.      Right Ear: External ear normal.      Left Ear: External ear normal.      Nose: Congestion present.   Eyes:      Extraocular Movements: Extraocular movements intact.      Conjunctiva/sclera: Conjunctivae normal.      Pupils: Pupils are equal, round, and reactive to light.   Pulmonary:      Effort: Pulmonary effort is normal.   Musculoskeletal:         General: Normal range of motion.      Cervical back: Normal range of motion.   Skin:     General: Skin is dry.   Neurological:      Mental Status: She is alert and oriented to  person, place, and time. Mental status is at baseline.   Psychiatric:         Mood and Affect: Mood normal.         Behavior: Behavior normal.         Thought Content: Thought content normal.         Judgment: Judgment normal.          Visit Time  Total Visit Duration: 20

## 2024-01-16 NOTE — LETTER
January 16, 2024     Patient: Emily De La Vega  YOB: 1975  Date of Visit: 1/16/2024      To Whom it May Concern:    Emily De La Vega is under my professional care. Emily was seen in my office on 1/16/2024. Emily may return to work on 1/18/24 .    If you have any questions or concerns, please don't hesitate to call.         Sincerely,          Rambo Sanz,         CC: No Recipients

## 2024-04-29 ENCOUNTER — DOCUMENTATION (OUTPATIENT)
Dept: FAMILY MEDICINE CLINIC | Facility: CLINIC | Age: 49
End: 2024-04-29

## 2024-04-29 DIAGNOSIS — R45.89 DEPRESSED MOOD: ICD-10-CM

## 2024-04-29 DIAGNOSIS — F41.1 GENERALIZED ANXIETY DISORDER: ICD-10-CM

## 2024-04-29 RX ORDER — BUPROPION HYDROCHLORIDE 300 MG/1
300 TABLET ORAL EVERY MORNING
Qty: 90 TABLET | Refills: 0 | Status: SHIPPED | OUTPATIENT
Start: 2024-04-29

## 2024-04-29 NOTE — PROGRESS NOTES
Replied to PT advising that most recent lab orders were mailed out to her about 2 weeks ago, and that she should wait until end of this week to see if post office delivers our mail to her. If not, PT should call office.

## 2024-05-11 LAB
ALBUMIN SERPL-MCNC: 4.2 G/DL (ref 3.5–5.7)
ALP SERPL-CCNC: 103 U/L (ref 35–120)
ALT SERPL-CCNC: 24 U/L
ANION GAP SERPL CALCULATED.3IONS-SCNC: 7 MMOL/L (ref 3–11)
AST SERPL-CCNC: 24 U/L
BASOPHILS # BLD AUTO: 0 THOU/CMM (ref 0–0.1)
BASOPHILS NFR BLD AUTO: 1 %
BILIRUB SERPL-MCNC: 0.6 MG/DL (ref 0.2–1)
BUN SERPL-MCNC: 15 MG/DL (ref 7–25)
CALCIUM SERPL-MCNC: 9.7 MG/DL (ref 8.5–10.1)
CHLORIDE SERPL-SCNC: 104 MMOL/L (ref 100–109)
CHOLEST SERPL-MCNC: 237 MG/DL
CHOLEST/HDLC SERPL: 3.2 {RATIO}
CO2 SERPL-SCNC: 31 MMOL/L (ref 21–31)
CREAT SERPL-MCNC: 0.74 MG/DL (ref 0.4–1.1)
CYTOLOGY CMNT CVX/VAG CYTO-IMP: NORMAL
DIFFERENTIAL METHOD BLD: ABNORMAL
EOSINOPHIL # BLD AUTO: 0.5 THOU/CMM (ref 0–0.5)
EOSINOPHIL NFR BLD AUTO: 13 %
ERYTHROCYTE [DISTWIDTH] IN BLOOD BY AUTOMATED COUNT: 12.4 % (ref 12–16)
GFR/BSA.PRED SERPLBLD CYS-BASED-ARV: 98 ML/MIN/{1.73_M2}
GLUCOSE SERPL-MCNC: 86 MG/DL (ref 65–99)
HCT VFR BLD AUTO: 38.9 % (ref 35–43)
HDLC SERPL-MCNC: 74 MG/DL (ref 23–92)
HGB BLD-MCNC: 13.6 G/DL (ref 11.5–14.5)
LDLC SERPL CALC-MCNC: 147 MG/DL
LYMPHOCYTES # BLD AUTO: 1 THOU/CMM (ref 1–3)
LYMPHOCYTES NFR BLD AUTO: 30 %
MCH RBC QN AUTO: 32.7 PG (ref 26–34)
MCHC RBC AUTO-ENTMCNC: 34.9 G/DL (ref 32–37)
MCV RBC AUTO: 94 FL (ref 80–100)
MONOCYTES # BLD AUTO: 0.4 THOU/CMM (ref 0.3–1)
MONOCYTES NFR BLD AUTO: 13 %
NEUTROPHILS # BLD AUTO: 1.5 THOU/CMM (ref 1.8–7.8)
NEUTROPHILS NFR BLD AUTO: 43 %
NONHDLC SERPL-MCNC: 163 MG/DL
PLATELET # BLD AUTO: 266 THOU/CMM (ref 140–350)
PMV BLD REES-ECKER: 7.8 FL (ref 7.5–11.3)
POTASSIUM SERPL-SCNC: 4.5 MMOL/L (ref 3.5–5.2)
PROLACTIN SERPL-MCNC: 10.99 NG/ML
PROT SERPL-MCNC: 6.6 G/DL (ref 6.3–8.3)
RBC # BLD AUTO: 4.15 MILL/CMM (ref 3.7–4.7)
SODIUM SERPL-SCNC: 142 MMOL/L (ref 135–145)
TRIGL SERPL-MCNC: 78 MG/DL
TSH SERPL-ACNC: 3.21 UIU/ML (ref 0.45–5.33)
WBC # BLD AUTO: 3.5 THOU/CMM (ref 4–10)

## 2024-05-20 ENCOUNTER — TELEPHONE (OUTPATIENT)
Dept: ADMINISTRATIVE | Facility: OTHER | Age: 49
End: 2024-05-20

## 2024-05-20 NOTE — TELEPHONE ENCOUNTER
----- Message from Melany LEES sent at 5/20/2024  9:03 AM EDT -----  Regarding: Care gap request  05/20/24 9:03 AM    Hello, our patient Emily De La Vega has had Pap Smear (HPV) aka Cervical Cancer Screening completed/performed. Please assist in updating the patient chart by pulling the Care Everywhere (CE) document. The date of service is 03/21/2024.     Thank you,  Melany JAIN [facility]

## 2024-05-20 NOTE — TELEPHONE ENCOUNTER
Upon review of the In Basket request we were able to locate, review, and update the patient chart as requested for Pap Smear (HPV) aka Cervical Cancer Screening.    Any additional questions or concerns should be emailed to the Practice Liaisons via the appropriate education email address, please do not reply via In Basket.    Thank you  Ashlyn Kruse

## 2024-06-08 NOTE — PROGRESS NOTES
Adult Annual Physical  Name: Emily De La Vega      : 1975      MRN: [id number]  Encounter Provider: Rambo Sanz DO  Encounter Date: 2024   Encounter department: [facility]NERSHIP WITH [facility]    Assessment & Plan   1. Annual physical exam  -     POCT hemoglobin A1c  2. Encounter for vaccination  Comments:  Up-to-date on vaccines.  3. Generalized anxiety disorder  -     buPROPion (WELLBUTRIN XL) 300 mg 24 hr tablet; Take 1 tablet (300 mg total) by mouth every morning  4. Depressed mood  -     buPROPion (WELLBUTRIN XL) 300 mg 24 hr tablet; Take 1 tablet (300 mg total) by mouth every morning  5. Low vitamin D level  -     Vitamin D 25 hydroxy  6. Pituitary adenoma (HCC)  Assessment & Plan:  Latest prolactin check was within normal limits.  Continue to monitor yearly.  7. Vitamin deficiency  -     Vitamin B12/Folate, Serum Panel  8. Eyelid abnormality  -     hydrocortisone 1 % cream; Apply topically 2 (two) times a day    Immunizations and preventive care screenings were discussed with patient today. Appropriate education was printed on patient's after visit summary.    Counseling:  Alcohol/drug use: discussed moderation in alcohol intake, the recommendations for healthy alcohol use, and avoidance of illicit drug use.  Dental Health: discussed importance of regular tooth brushing, flossing, and dental visits.  Exercise: the importance of regular exercise/physical activity was discussed. Recommend exercise 3-5 times per week for at least 30 minutes.          Patient was seen today for an annual physical exam. Age appropriate screening tests were done as above. Annual labs were ordered to be done through [facility]. Patient will be contacted regarding results and follow up will be based on findings. Patient was counseled on the importance of proper diet and exercise. I recommend diets rich in lean meats and leafy green vegetables. Try to have 150 minutes  "of exercise weekly at moderate intensity. See problem based charting for any chronic problems or new findings and current workup/management.    I discussed with patient trialing hydrocortisone cream 2 times daily underneath the eyelids to help tighten up the skin.  We will check for vitamin deficiencies including B12/folate and vitamin D.  I will notify patient of results.    40 minutes were spent on chart review, examination, and plan of care. This note was dictated using software.     History of Present Illness     Adult Annual Physical:  Patient presents for annual physical.     Diet and Physical Activity:  - Diet/Nutrition: well balanced diet.  - Exercise: walking and moderate cardiovascular exercise.    General Health:  - Sleep:. better with melatonin  - Hearing: normal hearing bilateral ears.  - Vision: wears glasses.  - Dental: regular dental visits.    /GYN Health:  - Follows with GYN: yes.     Review of Systems   Constitutional:  Negative for fever.   Respiratory:  Negative for shortness of breath.    Cardiovascular:  Negative for chest pain.   Gastrointestinal:  Negative for abdominal pain, constipation and diarrhea.   Genitourinary:  Negative for difficulty urinating.   Skin:  Negative for rash.        Bags under eyes         Objective     /76   Pulse 84   Ht 5' 3\" (1.6 m)   Wt 64.9 kg (143 lb)   SpO2 98%   BMI 25.33 kg/m²     Physical Exam  Vitals and nursing note reviewed.   Constitutional:       General: She is not in acute distress.     Appearance: Normal appearance. She is well-developed.   HENT:      Head: Normocephalic and atraumatic.      Right Ear: Tympanic membrane, ear canal and external ear normal.      Left Ear: Tympanic membrane, ear canal and external ear normal.      Nose: Nose normal. No congestion.      Mouth/Throat:      Mouth: Mucous membranes are moist.      Pharynx: No oropharyngeal exudate or posterior oropharyngeal erythema.   Eyes:      Extraocular Movements: " Extraocular movements intact.      Conjunctiva/sclera: Conjunctivae normal.      Pupils: Pupils are equal, round, and reactive to light.      Comments: Noted bags under both eyelids with slightly dark appearance   Cardiovascular:      Rate and Rhythm: Normal rate and regular rhythm.      Heart sounds: Normal heart sounds. No murmur heard.  Pulmonary:      Effort: Pulmonary effort is normal. No respiratory distress.      Breath sounds: Normal breath sounds. No wheezing.   Abdominal:      General: Abdomen is flat.      Palpations: Abdomen is soft.      Tenderness: There is no abdominal tenderness. There is no guarding.   Musculoskeletal:         General: Normal range of motion.      Cervical back: Normal range of motion and neck supple.   Lymphadenopathy:      Cervical: No cervical adenopathy.   Skin:     General: Skin is warm and dry.      Findings: No rash.   Neurological:      General: No focal deficit present.      Mental Status: She is alert and oriented to person, place, and time. Mental status is at baseline.   Psychiatric:         Mood and Affect: Mood normal.         Behavior: Behavior normal.         Thought Content: Thought content normal.         Judgment: Judgment normal.     Administrative Statements

## 2024-06-18 ENCOUNTER — OFFICE VISIT (OUTPATIENT)
Dept: FAMILY MEDICINE CLINIC | Facility: CLINIC | Age: 49
End: 2024-06-18

## 2024-06-18 VITALS
DIASTOLIC BLOOD PRESSURE: 76 MMHG | HEART RATE: 84 BPM | WEIGHT: 143 LBS | SYSTOLIC BLOOD PRESSURE: 122 MMHG | HEIGHT: 63 IN | OXYGEN SATURATION: 98 % | BODY MASS INDEX: 25.34 KG/M2

## 2024-06-18 DIAGNOSIS — R79.89 LOW VITAMIN D LEVEL: ICD-10-CM

## 2024-06-18 DIAGNOSIS — F41.1 GENERALIZED ANXIETY DISORDER: ICD-10-CM

## 2024-06-18 DIAGNOSIS — H02.9 EYELID ABNORMALITY: ICD-10-CM

## 2024-06-18 DIAGNOSIS — Z23 ENCOUNTER FOR VACCINATION: ICD-10-CM

## 2024-06-18 DIAGNOSIS — E56.9 VITAMIN DEFICIENCY: ICD-10-CM

## 2024-06-18 DIAGNOSIS — R45.89 DEPRESSED MOOD: ICD-10-CM

## 2024-06-18 DIAGNOSIS — D35.2 PITUITARY ADENOMA (HCC): ICD-10-CM

## 2024-06-18 DIAGNOSIS — Z00.00 ANNUAL PHYSICAL EXAM: Primary | ICD-10-CM

## 2024-06-18 LAB — SL AMB POCT HEMOGLOBIN AIC: 4.9 (ref ?–6.5)

## 2024-06-18 PROCEDURE — 99213 OFFICE O/P EST LOW 20 MIN: CPT | Performed by: STUDENT IN AN ORGANIZED HEALTH CARE EDUCATION/TRAINING PROGRAM

## 2024-06-18 PROCEDURE — 99396 PREV VISIT EST AGE 40-64: CPT | Performed by: STUDENT IN AN ORGANIZED HEALTH CARE EDUCATION/TRAINING PROGRAM

## 2024-06-18 PROCEDURE — 83036 HEMOGLOBIN GLYCOSYLATED A1C: CPT | Performed by: STUDENT IN AN ORGANIZED HEALTH CARE EDUCATION/TRAINING PROGRAM

## 2024-06-18 PROCEDURE — 36415 COLL VENOUS BLD VENIPUNCTURE: CPT | Performed by: STUDENT IN AN ORGANIZED HEALTH CARE EDUCATION/TRAINING PROGRAM

## 2024-06-18 RX ORDER — BUPROPION HYDROCHLORIDE 300 MG/1
300 TABLET ORAL EVERY MORNING
Qty: 90 TABLET | Refills: 3 | Status: SHIPPED | OUTPATIENT
Start: 2024-06-18

## 2024-06-18 RX ORDER — BENZOCAINE/MENTHOL 6 MG-10 MG
LOZENGE MUCOUS MEMBRANE 2 TIMES DAILY
Qty: 20 G
Start: 2024-06-18

## 2024-06-19 LAB
25(OH)D3 SERPL-MCNC: 32 NG/ML (ref 30–100)
FOLATE SERPL-MCNC: 13.4 NG/ML
VIT B12 SERPL-MCNC: 468 PG/ML (ref 200–1100)

## 2024-07-11 DIAGNOSIS — F41.9 ANXIETY: ICD-10-CM

## 2024-07-11 RX ORDER — HYDROXYZINE HYDROCHLORIDE 25 MG/1
25 TABLET, FILM COATED ORAL DAILY PRN
Qty: 90 TABLET | Refills: 3 | Status: SHIPPED | OUTPATIENT
Start: 2024-07-11

## 2024-11-29 ENCOUNTER — OFFICE VISIT (OUTPATIENT)
Age: 49
End: 2024-11-29

## 2024-11-29 VITALS
RESPIRATION RATE: 14 BRPM | OXYGEN SATURATION: 99 % | WEIGHT: 146 LBS | BODY MASS INDEX: 25.87 KG/M2 | DIASTOLIC BLOOD PRESSURE: 80 MMHG | HEIGHT: 63 IN | HEART RATE: 86 BPM | TEMPERATURE: 97.7 F | SYSTOLIC BLOOD PRESSURE: 124 MMHG

## 2024-11-29 DIAGNOSIS — M25.511 ACUTE PAIN OF BOTH SHOULDERS: ICD-10-CM

## 2024-11-29 DIAGNOSIS — M79.602 BILATERAL ARM PAIN: Primary | ICD-10-CM

## 2024-11-29 DIAGNOSIS — M25.512 ACUTE PAIN OF BOTH SHOULDERS: ICD-10-CM

## 2024-11-29 DIAGNOSIS — M79.601 BILATERAL ARM PAIN: Primary | ICD-10-CM

## 2024-11-29 PROCEDURE — MEDROL 4MG MEDROL 4MG: Performed by: STUDENT IN AN ORGANIZED HEALTH CARE EDUCATION/TRAINING PROGRAM

## 2024-11-29 PROCEDURE — 99213 OFFICE O/P EST LOW 20 MIN: CPT | Performed by: NURSE PRACTITIONER

## 2024-11-29 RX ORDER — METHYLPREDNISOLONE 4 MG/1
TABLET ORAL
Start: 2024-11-29

## 2024-11-29 NOTE — PROGRESS NOTES
Name: Emily De La Vega      : 1975      MRN: [id number]  Encounter Provider: JONAS West  Encounter Date: 2024   Encounter department: [facility] PARTNERSHIP WITH [facility]  :  Assessment & Plan  Bilateral arm pain  Pt requests trying a steroid pack. I advised that she will likely need PT at this point and possible further work up if sx persist or she is not tolerating PT. Medrol dose pack given in office. Advised on SE and use. Pt referral placed.   Orders:    methylPREDNISolone 4 MG tablet therapy pack; Use as directed on package    Ambulatory Referral to Physical Therapy; Future    Acute pain of both shoulders  Pt requests trying a steroid pack. I advised that she will likely need PT at this point and possible further work up if sx persist or she is not tolerating PT. Medrol dose pack given in office. Advised on SE and use. Pt referral placed.   Orders:  Orders:    Ambulatory Referral to Physical Therapy; Future           History of Present Illness     Pt reports she was working out 2 months ago lifting weight and noticed shortly after that she was having pain bilaterally in her bicept area. Pt states she now continues to have pain in the bicep and shoulder area bilaterally. Pt reports certain movements cause the pain, internal rotation, external rotation, and sleeping on her side cause pain. At it's worst she reports pain is an aching 6/10, and at it's best a 0/10. Pt denies injury to the area. She has tried massage gun, tylenol,  stretching her arms with no relief. Pt reports a she will feel a pop in her shoulder and then has 30 seconds of pain. Pt denies numbness tingling in hands or fingers.     Arm Pain   Injury mechanism: working out, lifting weights. The quality of the pain is described as aching. The pain is mild. The pain has been Intermittent since the incident. Pertinent negatives include no chest pain, muscle weakness, numbness or  "tingling. The symptoms are aggravated by movement. She has tried acetaminophen (massage) for the symptoms. The treatment provided no relief.     Review of Systems   Constitutional: Negative.  Negative for fatigue and fever.   HENT: Negative.     Respiratory: Negative.  Negative for chest tightness and shortness of breath.    Cardiovascular: Negative.  Negative for chest pain.   Gastrointestinal: Negative.    Genitourinary: Negative.    Musculoskeletal:  Negative for back pain, myalgias, neck pain and neck stiffness.        Shoulder and bicep pain bilaterally.    Skin: Negative.  Negative for rash.   Neurological:  Negative for dizziness, tingling, tremors, syncope, weakness and numbness.          Objective   /80 (BP Location: Right arm, Patient Position: Sitting, Cuff Size: Standard)   Pulse 86   Temp 97.7 °F (36.5 °C) (Temporal)   Resp 14   Ht 5' 3\" (1.6 m)   Wt 66.2 kg (146 lb)   SpO2 99%   BMI 25.86 kg/m²      Physical Exam  Vitals and nursing note reviewed.   Constitutional:       General: She is not in acute distress.     Appearance: Normal appearance. She is not ill-appearing.   HENT:      Head: Normocephalic and atraumatic.      Nose: Nose normal.   Eyes:      General:         Right eye: No discharge.         Left eye: No discharge.      Conjunctiva/sclera: Conjunctivae normal.   Cardiovascular:      Rate and Rhythm: Normal rate and regular rhythm.      Heart sounds: Normal heart sounds.   Pulmonary:      Effort: Pulmonary effort is normal.      Breath sounds: Normal breath sounds.   Musculoskeletal:      Right shoulder: No swelling, deformity, effusion, laceration, tenderness, bony tenderness or crepitus. Decreased range of motion. Normal strength. Normal pulse.      Left shoulder: No swelling, deformity, effusion, laceration, tenderness, bony tenderness or crepitus. Decreased range of motion. Normal strength. Normal pulse.      Right upper arm: No swelling, edema, deformity, lacerations, " tenderness or bony tenderness.      Left upper arm: No swelling, edema, deformity, lacerations, tenderness or bony tenderness.      Cervical back: Normal range of motion.      Right lower leg: No edema.      Left lower leg: No edema.      Comments: Patient reports pain with internal rotation, external rotation, flexion and extension.    Lymphadenopathy:      Cervical: No cervical adenopathy.   Skin:     General: Skin is warm and dry.   Neurological:      Mental Status: She is alert and oriented to person, place, and time.   Psychiatric:         Mood and Affect: Mood normal.         Behavior: Behavior normal.         Thought Content: Thought content normal.         Judgment: Judgment normal.       Administrative Statements   I have spent a total time of 18 minutes in caring for this patient on the day of the visit/encounter including Instructions for management, Impressions, Documenting in the medical record, Reviewing / ordering tests, medicine, procedures  , and Obtaining or reviewing history  .

## 2025-01-15 DIAGNOSIS — Z00.6 ENCOUNTER FOR EXAMINATION FOR NORMAL COMPARISON OR CONTROL IN CLINICAL RESEARCH PROGRAM: ICD-10-CM

## 2025-02-05 DIAGNOSIS — M25.511 ACUTE PAIN OF BOTH SHOULDERS: Primary | ICD-10-CM

## 2025-02-05 DIAGNOSIS — M25.512 ACUTE PAIN OF BOTH SHOULDERS: Primary | ICD-10-CM

## 2025-02-08 ENCOUNTER — APPOINTMENT (OUTPATIENT)
Dept: RADIOLOGY | Facility: CLINIC | Age: 50
End: 2025-02-08
Payer: COMMERCIAL

## 2025-02-08 DIAGNOSIS — M25.512 ACUTE PAIN OF BOTH SHOULDERS: ICD-10-CM

## 2025-02-08 DIAGNOSIS — M25.511 ACUTE PAIN OF BOTH SHOULDERS: ICD-10-CM

## 2025-02-08 PROCEDURE — 73030 X-RAY EXAM OF SHOULDER: CPT

## 2025-02-15 ENCOUNTER — RESULTS FOLLOW-UP (OUTPATIENT)
Dept: FAMILY MEDICINE CLINIC | Facility: CLINIC | Age: 50
End: 2025-02-15

## 2025-02-18 ENCOUNTER — TELEPHONE (OUTPATIENT)
Dept: FAMILY MEDICINE CLINIC | Facility: CLINIC | Age: 50
End: 2025-02-18

## 2025-02-18 DIAGNOSIS — M79.601 BILATERAL ARM PAIN: ICD-10-CM

## 2025-02-18 DIAGNOSIS — M25.512 ACUTE PAIN OF BOTH SHOULDERS: Primary | ICD-10-CM

## 2025-02-18 DIAGNOSIS — M79.602 BILATERAL ARM PAIN: ICD-10-CM

## 2025-02-18 DIAGNOSIS — R29.898 WEAKNESS OF BOTH SHOULDERS: ICD-10-CM

## 2025-02-18 DIAGNOSIS — M25.511 ACUTE PAIN OF BOTH SHOULDERS: Primary | ICD-10-CM

## 2025-02-18 NOTE — TELEPHONE ENCOUNTER
Initiated MRI for Left and right shoulders through [facility]. MRI PA is still pending and in clinical review. All required documents were uploaded electronically.    Orders were faxed to [facility] Scheduling.    Tracking Number: [id number]     MICHELLE Rolon

## 2025-02-19 NOTE — TELEPHONE ENCOUNTER
Megan called with authorization for RIGHT shoulder MRI.   Auth code #[id number].   Good from 2/18/25-3/20/25

## 2025-02-19 NOTE — TELEPHONE ENCOUNTER
I was notified through [facility] that MRI Left shoulder and MRI Right shoulder have been approved from 02/18/25-03/20/25.    MICHELLE Rolon

## 2025-03-11 ENCOUNTER — RESULTS FOLLOW-UP (OUTPATIENT)
Dept: FAMILY MEDICINE CLINIC | Facility: CLINIC | Age: 50
End: 2025-03-11

## 2025-03-11 DIAGNOSIS — M79.602 BILATERAL ARM PAIN: ICD-10-CM

## 2025-03-11 DIAGNOSIS — M79.601 BILATERAL ARM PAIN: ICD-10-CM

## 2025-03-11 DIAGNOSIS — R29.898 WEAKNESS OF BOTH SHOULDERS: ICD-10-CM

## 2025-03-11 DIAGNOSIS — M25.512 ACUTE PAIN OF BOTH SHOULDERS: Primary | ICD-10-CM

## 2025-03-11 DIAGNOSIS — M25.511 ACUTE PAIN OF BOTH SHOULDERS: Primary | ICD-10-CM

## 2025-06-13 ENCOUNTER — PATIENT MESSAGE (OUTPATIENT)
Dept: FAMILY MEDICINE CLINIC | Facility: CLINIC | Age: 50
End: 2025-06-13

## 2025-06-13 DIAGNOSIS — R79.89 LOW VITAMIN D LEVEL: ICD-10-CM

## 2025-06-13 DIAGNOSIS — Z13.220 LIPID SCREENING: Primary | ICD-10-CM

## 2025-06-13 NOTE — TELEPHONE ENCOUNTER
Annual labwork was entered into patient chart. Would you please send the orders to the patient? Thanks!

## 2025-07-02 DIAGNOSIS — R45.89 DEPRESSED MOOD: ICD-10-CM

## 2025-07-02 DIAGNOSIS — F41.1 GENERALIZED ANXIETY DISORDER: ICD-10-CM

## 2025-07-02 RX ORDER — BUPROPION HYDROCHLORIDE 300 MG/1
300 TABLET ORAL EVERY MORNING
Qty: 90 TABLET | Refills: 0 | Status: SHIPPED | OUTPATIENT
Start: 2025-07-02

## 2025-07-02 NOTE — TELEPHONE ENCOUNTER
Yes, the results are in the care everywhere part of her chart. Looks like everything was normal on the tests other than the cholesterol being slightly high but still better than last year.

## 2025-07-15 NOTE — PROGRESS NOTES
Name: Emily De La Vega      : 1975      MRN: [id number]  Encounter Provider: Rambo Sanz DO  Encounter Date: 2025   Encounter department: [facility] PARTNERSHIP WITH [facility]  :  Assessment & Plan  Annual physical exam  Reviewed recent lab work.  Continue to monitor labs yearly.  Orders:    CBC and differential; Future    Comprehensive metabolic panel; Future    Lipid Panel with Direct LDL reflex; Future    Skin lesion  Patient has 3 isolated red raised lumps that are very small and do not cause any pain.  They do not have the appearance of a folliculitis.  They look like little subcutaneous cysts.  I attempted to treat this with cryotherapy to see if the areas respond and shrink over time.  I advised the patient if they look better after treatment we can consider another round.  I would recommend an evaluation by dermatology if the areas do not respond to cryotherapy.  Orders:    Lesion Destruction    Pituitary adenoma (HCC)  Per history.  Continue to monitor.  Orders:    Prolactin; Future    Depressed mood  Patient is on Wellbutrin without any reported side effects.  Chronic and stable with no concerns           BMI Counseling: Body mass index is 25.33 kg/m². The BMI is above normal. Nutrition recommendations include encouraging healthy choices of fruits and vegetables. Exercise recommendations include moderate physical activity 150 minutes/week. Rationale for BMI follow-up plan is due to patient being overweight or obese.       Patient reports that diet and exercise are roughly the same as last year.  She is able to sleep with the use of melatonin at nighttime.  No changes in vision or hearing.  Patient had mammogram back in May.    Patient is a 49-year-old female presenting today with concerns of skin lesions that she would possibly like frozen or removed.    Patient was seen today for an annual physical exam. Age appropriate screening tests were done  as above. Annual labs were ordered to be done through [facility]. Patient will be contacted regarding results and follow up will be based on findings. Patient was counseled on the importance of proper diet and exercise. I recommend diets rich in lean meats and leafy green vegetables. Try to have 150 minutes of exercise weekly at moderate intensity. See problem based charting for any chronic problems or new findings and current workup/management.    40 minutes were spent on chart review, examination, and plan of care. This note was dictated using software.     History of Present Illness   HPI  Review of Systems   Constitutional:  Negative for fever.   Respiratory:  Negative for shortness of breath.    Cardiovascular:  Negative for chest pain.   Gastrointestinal:  Negative for abdominal pain, constipation and diarrhea.   Skin:  Negative for rash.        Skin lesion     Patient is a 49-year-old female presenting today with concern of skin lesions.  She has 3 red raised lumps on the thighs.  There is one on the medial aspect of the left thigh, 1 on the medial aspect of the right thigh and 1 on the lateral aspect of the right thigh.  These have been present for 2 years and the lateral one is relatively new.  It does not cause any pain but it can get irritated over time.    Objective   /70 (BP Location: Left arm, Patient Position: Sitting, Cuff Size: Standard)   Pulse 95   Temp 97.6 °F (36.4 °C) (Temporal)   Resp 14   Wt 64.9 kg (143 lb)   SpO2 98%   BMI 25.33 kg/m²      Physical Exam  Vitals and nursing note reviewed.   Constitutional:       General: She is not in acute distress.     Appearance: Normal appearance. She is well-developed.   HENT:      Head: Normocephalic and atraumatic.      Right Ear: External ear normal.      Left Ear: External ear normal.      Nose: Nose normal.      Mouth/Throat:      Mouth: Mucous membranes are moist.     Eyes:      Conjunctiva/sclera: Conjunctivae normal.        Cardiovascular:      Rate and Rhythm: Normal rate and regular rhythm.      Heart sounds: Normal heart sounds. No murmur heard.  Pulmonary:      Effort: Pulmonary effort is normal. No respiratory distress.      Breath sounds: Normal breath sounds. No wheezing.     Skin:     General: Skin is warm and dry.      Findings: Lesion present. No rash.     Neurological:      General: No focal deficit present.      Mental Status: She is alert and oriented to person, place, and time. Mental status is at baseline.     Psychiatric:         Mood and Affect: Mood normal.         Behavior: Behavior normal.         Thought Content: Thought content normal.         Judgment: Judgment normal.       Lesion Destruction    Date/Time: 7/16/2025 12:40 PM    Performed by: Rambo Sanz DO  Authorized by: Rambo Sanz DO    [facility] Protocol:  Consent: Verbal consent obtained. Written consent obtained  Consent given by: patient  Patient understanding: patient states understanding of the procedure being performed  Patient consent: the patient's understanding of the procedure matches consent given  Procedure consent: procedure consent matches procedure scheduled  Patient identity confirmed: verbally with patient    Procedure Details - Lesion Destruction:     Number of Lesions:  3  Lesion 1:     Body area:  Lower extremity    Lower extremity location:  L upper leg    Initial size (mm):  5    Final defect size (mm):  5    Malignancy: benign hyperkeratotic lesion      Destruction method: cryotherapy    Lesion 2:     Body area:  Lower extremity    Lower extremity location:  R upper leg (medial)    Initial size (mm):  5    Final defect size (mm):  5    Malignancy: benign hyperkeratotic lesion      Destruction method: cryotherapy    Lesion 3:     Body area:  Lower extremity    Lower extremity location:  R upper leg (lateral)    Initial size (mm):  4    Final defect size (mm):  4    Malignancy: benign hyperkeratotic lesion       Destruction method: cryotherapy         Administrative Statements   I have spent a total time of 20 minutes in caring for this patient on the day of the visit/encounter including Instructions for management, Documenting in the medical record, and Obtaining or reviewing history  .

## 2025-07-16 ENCOUNTER — OFFICE VISIT (OUTPATIENT)
Dept: FAMILY MEDICINE CLINIC | Facility: CLINIC | Age: 50
End: 2025-07-16

## 2025-07-16 VITALS
DIASTOLIC BLOOD PRESSURE: 70 MMHG | WEIGHT: 143 LBS | RESPIRATION RATE: 14 BRPM | TEMPERATURE: 97.6 F | SYSTOLIC BLOOD PRESSURE: 110 MMHG | OXYGEN SATURATION: 98 % | HEART RATE: 95 BPM | BODY MASS INDEX: 25.33 KG/M2

## 2025-07-16 DIAGNOSIS — L98.9 SKIN LESION: ICD-10-CM

## 2025-07-16 DIAGNOSIS — Z00.00 ANNUAL PHYSICAL EXAM: Primary | ICD-10-CM

## 2025-07-16 DIAGNOSIS — D35.2 PITUITARY ADENOMA (HCC): ICD-10-CM

## 2025-07-16 DIAGNOSIS — R45.89 DEPRESSED MOOD: ICD-10-CM

## 2025-07-16 PROCEDURE — 99396 PREV VISIT EST AGE 40-64: CPT | Performed by: STUDENT IN AN ORGANIZED HEALTH CARE EDUCATION/TRAINING PROGRAM

## 2025-07-16 PROCEDURE — 99213 OFFICE O/P EST LOW 20 MIN: CPT | Performed by: STUDENT IN AN ORGANIZED HEALTH CARE EDUCATION/TRAINING PROGRAM

## 2025-07-16 RX ORDER — PROGESTERONE 100 MG/1
100 CAPSULE ORAL DAILY
COMMUNITY
Start: 2025-06-13 | End: 2026-06-13

## 2025-07-16 NOTE — ASSESSMENT & PLAN NOTE
Patient is on Wellbutrin without any reported side effects.  Chronic and stable with no concerns